# Patient Record
Sex: MALE | Race: BLACK OR AFRICAN AMERICAN | NOT HISPANIC OR LATINO | Employment: FULL TIME | URBAN - METROPOLITAN AREA
[De-identification: names, ages, dates, MRNs, and addresses within clinical notes are randomized per-mention and may not be internally consistent; named-entity substitution may affect disease eponyms.]

---

## 2021-11-17 ENCOUNTER — OFFICE VISIT (OUTPATIENT)
Dept: URGENT CARE | Facility: URGENT CARE | Age: 21
End: 2021-11-17
Payer: COMMERCIAL

## 2021-11-17 VITALS
DIASTOLIC BLOOD PRESSURE: 92 MMHG | WEIGHT: 190.2 LBS | SYSTOLIC BLOOD PRESSURE: 131 MMHG | OXYGEN SATURATION: 97 % | HEART RATE: 76 BPM | TEMPERATURE: 98 F

## 2021-11-17 DIAGNOSIS — R07.0 THROAT PAIN: ICD-10-CM

## 2021-11-17 DIAGNOSIS — R68.83 CHILLS: ICD-10-CM

## 2021-11-17 DIAGNOSIS — J10.1 INFLUENZA B: Primary | ICD-10-CM

## 2021-11-17 LAB
DEPRECATED S PYO AG THROAT QL EIA: NEGATIVE
FLUAV AG SPEC QL IA: NEGATIVE
FLUBV AG SPEC QL IA: POSITIVE

## 2021-11-17 PROCEDURE — 87651 STREP A DNA AMP PROBE: CPT | Performed by: FAMILY MEDICINE

## 2021-11-17 PROCEDURE — 99203 OFFICE O/P NEW LOW 30 MIN: CPT | Performed by: FAMILY MEDICINE

## 2021-11-17 PROCEDURE — 87804 INFLUENZA ASSAY W/OPTIC: CPT | Performed by: FAMILY MEDICINE

## 2021-11-17 NOTE — LETTER
November 17, 2021      Clifford Steiner  [address]  [facility] [address]        To Whom It May Concern,     Clifford Steiner was seen in clinic today. He should be able to return on or about November 19, 2021       Sincerely,        Beau Schmitz MD

## 2021-11-18 LAB — GROUP A STREP BY PCR: NOT DETECTED

## 2021-11-18 NOTE — PROGRESS NOTES
ICD-10-CM    1. Influenza B  J10.1    2. Throat pain  R07.0 Streptococcus A Rapid Screen w/Reflex to PCR - Clinic Collect     Group A Streptococcus PCR Throat Swab   3. Chills  R68.83 Influenza A & B Antigen - Clinic Collect   fever seems to be resolving and overall is starting to feel better. Symptomatic therapy and follow up discussed. educational info given.   -------------------------------  Clifford Steiner with presents with 3-4 days symptoms including sore throat, congestion, non productive cough, achiness, low grade fevers, low energy. No trouble breathing. .    Tested negative already for covid today.    Treatment measures tried include Tylenol/Ibuprofen and OTC Cough med.  Exposures--none  Recent travel--no    No current outpatient medications on file.       ROS otherwise negative for resp., ID,  HEENT symptoms.    Objective: BP (!) 131/92 (BP Location: Left arm, Patient Position: Sitting, Cuff Size: Adult Regular)   Pulse 76   Temp 98  F (36.7  C) (Tympanic)   Wt 86.3 kg (190 lb 3.2 oz)   SpO2 97%   Exam:  GENERAL APPEARANCE: healthy, alert and no distress  EYES: Eyes grossly normal to inspection  HENT: ear canals and TM's normal and nose and mouth without ulcers or lesions  NECK: no adenopathy, no asymmetry, masses, or scars and thyroid normal to palpation  RESP: lungs clear to auscultation - no rales, rhonchi or wheezes  CV: regular rates and rhythm, no murmur

## 2021-11-18 NOTE — PATIENT INSTRUCTIONS
ibuprofen for achiness and pain and fever    Pseudoephedrine, guaifenesin, afrin (oxymetolazone-max 3 days) and hot steamy beverages and soups and showers for congestion.    Patient Education     Influenza (Adult)    Influenza is also called the flu. It's a viral illness that affects the air passages of your lungs. It's different from the common cold. The flu can easily be passed from one to person to another. It may be spread through the air by coughing and sneezing. Or it can be spread by touching the sick person and then touching your own eyes, nose, or mouth.   The flu starts 1 to 3 days after you are exposed to the flu virus. It may last for 1 to 2 weeks but sometimes people feel tired or fatigued for many weeks afterward. You usually don t need to take antibiotics unless you are at high risk for or have a complication . This might be an ear or sinus infection or pneumonia.   Symptoms of the flu may be mild or severe. They can include extreme tiredness (wanting to stay in bed all day), chills, fevers, muscle aches, soreness with eye movement, headache, and a dry, hacking cough.   Antiviral medicine for the flu is available by prescription. If you start taking it within 48 hours, it may help reduce how long your symptoms last and how severe they are. Your provider may do a test to find out if you have influenza and which strain you have.   Home care  Follow these guidelines when caring for yourself at home:    Stay away from cigarette smoke, whether yours or other people s.    Acetaminophen or ibuprofen will help ease your fever, muscle aches, and headache. Don t give aspirin to anyone younger than 18 who has the flu. This can cause a serious condition called Reye syndrome.    Nausea, loose stools, and loss of appetite are common with the flu. Eat light meals. Drink 6 to 8 glasses of liquids every day. Good choices are water, sport drinks, soft drinks without caffeine, juices, tea, and soup. Extra fluids will  also help loosen secretions in your nose and lungs.    Over-the-counter cold medicines will not make the flu go away faster. But the medicines may help with coughing, sore throat, and congestion in your nose and sinuses. Don t use a decongestant if you have high blood pressure.    Stay home until your fever has been gone for at least 24 hours without using medicine to reduce fever.  Follow-up care  Follow up with your healthcare provider, or as advised, if you are not getting better over the next week.   If you are age 65 or older, talk with your provider about getting a pneumococcal vaccine every 5 years. You should also get this vaccine if you have chronic asthma or COPD. All adults should get a flu vaccine every fall. Ask your provider about this.   When to seek medical advice  Call your healthcare provider right away if you have the flu and any of these occur:     Cough with lots of colored mucus (sputum) or blood in your mucus    Chest pain, shortness of breath, wheezing, or trouble breathing    Severe headache, or face, neck, or ear pain    New rash with fever    Fever of 100.4 F (38 C) or higher, or as directed by your healthcare provider    Confusion, behavior change, or seizure    Severe weakness or dizziness    You get a new fever or cough after getting better for a few days  Also call your provider if you have flu symptoms and have a weakened immune system or are taking medicines that can weaken your immune system. These include steroids and certain anti-inflammatory medicines.   Fe last reviewed this educational content on 10/1/2019    0309-3829 [employer]. All rights reserved. This information is not intended as a substitute for professional medical care. Always follow your healthcare professional's instructions.

## 2022-04-26 ENCOUNTER — OFFICE VISIT (OUTPATIENT)
Dept: URGENT CARE | Facility: URGENT CARE | Age: 22
End: 2022-04-26
Payer: OTHER MISCELLANEOUS

## 2022-04-26 ENCOUNTER — ANCILLARY PROCEDURE (OUTPATIENT)
Dept: GENERAL RADIOLOGY | Facility: CLINIC | Age: 22
End: 2022-04-26
Attending: PHYSICIAN ASSISTANT
Payer: OTHER MISCELLANEOUS

## 2022-04-26 VITALS
DIASTOLIC BLOOD PRESSURE: 84 MMHG | SYSTOLIC BLOOD PRESSURE: 137 MMHG | TEMPERATURE: 97.9 F | HEART RATE: 57 BPM | WEIGHT: 169.4 LBS | OXYGEN SATURATION: 100 %

## 2022-04-26 DIAGNOSIS — S91.209A TRAUMATIC AVULSION OF NAIL PLATE OF TOE, INITIAL ENCOUNTER: Primary | ICD-10-CM

## 2022-04-26 DIAGNOSIS — S97.112A CRUSHING INJURY OF LEFT GREAT TOE, INITIAL ENCOUNTER: ICD-10-CM

## 2022-04-26 DIAGNOSIS — Z23 NEED FOR TETANUS BOOSTER: ICD-10-CM

## 2022-04-26 PROCEDURE — 99213 OFFICE O/P EST LOW 20 MIN: CPT | Mod: 25 | Performed by: PHYSICIAN ASSISTANT

## 2022-04-26 PROCEDURE — 90715 TDAP VACCINE 7 YRS/> IM: CPT | Performed by: PHYSICIAN ASSISTANT

## 2022-04-26 PROCEDURE — 90471 IMMUNIZATION ADMIN: CPT | Performed by: PHYSICIAN ASSISTANT

## 2022-04-26 PROCEDURE — 73660 X-RAY EXAM OF TOE(S): CPT | Mod: LT | Performed by: RADIOLOGY

## 2022-04-26 RX ORDER — NAPROXEN 500 MG/1
500 TABLET ORAL 2 TIMES DAILY WITH MEALS
Qty: 20 TABLET | Refills: 1 | Status: SHIPPED | OUTPATIENT
Start: 2022-04-26

## 2022-04-26 NOTE — LETTER
[facility]  [address] [facility] [address]  Phone: [phone]    April 26, 2022        Clifford Steiner  [address] [facility] [address]          To whom it may concern:    RE: Clifford Steiner    Patient was seen and treated today at our clinic.  Sustained a crush injury to the left great toe with nail avulsion.  Updated on tetanus.  Dressing placed.  Start antibiotic.  Follow-up with podiatry within 3 days for recheck.  In the interim restrictions as follow: No lifting more than 5 pounds.  No standing more than 15 minutes at a time.  No ladders.  Postop shoe at all times until seen by specialist later this week.  Any paperwork or further restrictions should be through podiatry/Ortho.  Naprosyn as needed for pain.    Please contact me for questions or concerns.      Sincerely,        Rula Pichardo PA-C

## 2022-04-26 NOTE — TELEPHONE ENCOUNTER
DIAGNOSIS: Crushing injury of left great toe, Traumatic avulsion of nail plate of toe   APPOINTMENT DATE: 05/03/2022   NOTES STATUS DETAILS   OFFICE NOTE from referring provider Internal 04/26/2022 Dr Pichardo [facility] urgent care   OFFICE NOTE from other specialist N/A    DISCHARGE SUMMARY from hospital N/A    DISCHARGE REPORT from the ER N/A    OPERATIVE REPORT N/A    EMG report N/A    MEDICATION LIST N/A    MRI N/A    DEXA (osteoporosis/bone health) N/A    CT SCAN N/A    XRAYS (IMAGES & REPORTS) Internal 04/26/2022 LFT toe

## 2022-04-26 NOTE — PROGRESS NOTES
Chief Complaint   Patient presents with     Toe Pain     Metal bar fell on left big toe today at work. Swelling, able to move it , but pt states it feels funny.      X-ray-I do not see any obvious fracture.      ASSESSMENT:    ICD-10-CM    1. Traumatic avulsion of nail plate of toe, initial encounter  S91.209A XR Toe Left G/E 2 Views     TDAP VACCINE (Adacel, Boostrix)  [[id number]]     Orthopedic  Referral     Ankle/Foot Bracing Supplies Order for DME - ONLY FOR DME     amoxicillin-clavulanate (AUGMENTIN) 875-125 MG tablet     naproxen (NAPROSYN) 500 MG tablet   2. Crushing injury of left great toe, initial encounter  S97.112A XR Toe Left G/E 2 Views     TDAP VACCINE (Adacel, Boostrix)  [[id number]]     Orthopedic  Referral     Ankle/Foot Bracing Supplies Order for DME - ONLY FOR DME     amoxicillin-clavulanate (AUGMENTIN) 875-125 MG tablet     naproxen (NAPROSYN) 500 MG tablet   3. Need for tetanus booster  Z23 XR Toe Left G/E 2 Views     TDAP VACCINE (Adacel, Boostrix)  [[id number]]     Orthopedic  Referral     Ankle/Foot Bracing Supplies Order for DME - ONLY FOR DME     amoxicillin-clavulanate (AUGMENTIN) 875-125 MG tablet     naproxen (NAPROSYN) 500 MG tablet           PLAN:Patient was seen and treated today at our clinic.  Sustained a crush injury to the left great toe with nail avulsion.  Updated on tetanus.  Dressing placed.  Start antibiotic.  Follow-up with podiatry within 3 days for recheck.  In the interim restrictions as follow: No lifting more than 5 pounds.  No standing more than 15 minutes at a time.  No ladders.  Postop shoe at all times until seen by specialist later this week.  Any paperwork or further restrictions should be through podiatry/Ortho.  Naprosyn as needed for pain. Likely will loose the nail.           Rula Pichardo PA-C        SUBJECTIVE:  Clifford Steiner is an 21 year old male who presents with work injury today.  Was disassembling a shelf and a metal  bar that weighed from 20 to 50 pounds dropped on his left great toe.  Last tetanus shot 2012.  Bled quite a bit.  Broke the skin. 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    tdap 2012  No past medical history on file.  History   Smoking Status     Not on file   Smokeless Tobacco     Not on file       ROS:  GEN no fevers  SKIN no erythema  Musculoskeletal:  See HPI.      OBJECTIVE:  Blood pressure 137/84, pulse 57, temperature 97.9  F (36.6  C), temperature source Tympanic, weight 76.8 kg (169 lb 6.4 oz), SpO2 100 %.  Patient is alert and NAD.  EYES: conjunctiva clear  Ankle/foot exam (left):  Inspection/palpation: Left great toe is with ripped skin proximal to the bottom nail edge where nail popped up. Nail mildly loose but still in place.  Able to wiggle great toe.Toe tender. Sensation to soft touch intact.  Cap refill intact.    Good doralis pedis.  Neurovascularly Intact Distally.         Rula Pichardo PA-C

## 2022-05-03 ENCOUNTER — OFFICE VISIT (OUTPATIENT)
Dept: PODIATRY | Facility: CLINIC | Age: 22
End: 2022-05-03
Attending: PHYSICIAN ASSISTANT
Payer: OTHER MISCELLANEOUS

## 2022-05-03 ENCOUNTER — PRE VISIT (OUTPATIENT)
Dept: PODIATRY | Facility: CLINIC | Age: 22
End: 2022-05-03

## 2022-05-03 DIAGNOSIS — S91.209A TRAUMATIC AVULSION OF NAIL PLATE OF TOE, INITIAL ENCOUNTER: ICD-10-CM

## 2022-05-03 DIAGNOSIS — S97.112A CRUSHING INJURY OF LEFT GREAT TOE, INITIAL ENCOUNTER: ICD-10-CM

## 2022-05-03 PROCEDURE — 99204 OFFICE O/P NEW MOD 45 MIN: CPT | Performed by: PODIATRIST

## 2022-05-03 NOTE — NURSING NOTE
Clifford Steiner's chief complaint for this visit includes:  Chief Complaint   Patient presents with     Consult     Crush injury to left foot grand toe     PCP: No Ref-Primary, Physician    Referring Provider:  [place] [address]    There were no vitals taken for this visit.  Data Unavailable        Allergies   Allergen Reactions     Pineapple Other (See Comments)     Possible (positive RAST test as toddler)         Do you need any medication refills at today's visit?

## 2022-05-03 NOTE — LETTER
5/3/2022         RE: Clifford Steiner  [address]  [place] [address]        Dear Colleague,    Thank you for referring your patient, Clifford Steiner, to the [facility]. Please see a copy of my visit note below.    No past medical history on file.  There is no problem list on file for this patient.    No past surgical history on file.  Social History     Socioeconomic History     Marital status: Single     Spouse name: Not on file     Number of children: Not on file     Years of education: Not on file     Highest education level: Not on file   Occupational History     Not on file   Tobacco Use     Smoking status: Not on file     Smokeless tobacco: Not on file   Substance and Sexual Activity     Alcohol use: Not on file     Drug use: Not on file     Sexual activity: Not on file   Other Topics Concern     Not on file   Social History Narrative     Not on file     Social Determinants of Health     Financial Resource Strain: Not on file   Food Insecurity: Not on file   Transportation Needs: Not on file   Physical Activity: Not on file   Stress: Not on file   Social Connections: Not on file   Intimate Partner Violence: Not on file   Housing Stability: Not on file     No family history on file.        SUBJECTIVE FINDINGS:  21-year-old presents from urgent care for a crush injury, left great toe.  Relates he dropped a metal bar on it about a week ago.  I reviewed the 04/26/2022 urgent care note.  Relates he is taking his last day of Augmentin.  He has had no problems with that.  Relates he is getting better.  He is wearing a surgical shoe.  He was supposed to be putting an antibiotic ointment on it, but he left that so he does not have that.  Relates that he does not need any more of the Naprosyn. 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    OBJECTIVE FINDINGS:  DP and PT are 2/4, left.  He has a left hallux nail that is intact.  The proximal nail with eschar with  partially open lesion.  There is minimal erythema, positive edema, no active serosanguineous drainage, no odor, no calor.  There is pain on palpation.    X-rays reviewed from 04/26/2022.  Joint and cortical margins are intact.  There is some edema.  No gross fractures.  Report also reviewed.    ASSESSMENT AND PLAN:  Crush injury, left hallux, with partial nail avulsion.  Diagnosis and treatment options discussed with the patient.  I am going to have him daily rinse this with Wound Vashe, apply Betadine and wrap with Kerlix and Coban for light compression.  Continue the surgical shoe.  Continue rest and elevate.  I advised him to be off work, a sitting job only would be okay.  Plan will be to re-x-ray in 1 week to make sure there is no hairline fractures.  We will leave the nail plate in place.  This appears to be re-adhered without abscess.  Continue the Augmentin.  Return to clinic and see me in 1 week.              Moderate level of medical decision making.      Again, thank you for allowing me to participate in the care of your patient.        Sincerely,        Eulalio Almanza DPM

## 2022-05-03 NOTE — LETTER
May 3, 2022      RE: Clifford Steiner  [address]  [place] [facility] [address]        To whom it may concern:    Clifford Steiner is under my professional care. For the next 4 to 6 weeks he is only to do sitting work.     Sincerely,      Eulalio Almanza DPM

## 2022-05-03 NOTE — PROGRESS NOTES
No past medical history on file.  There is no problem list on file for this patient.    No past surgical history on file.  Social History     Socioeconomic History     Marital status: Single     Spouse name: Not on file     Number of children: Not on file     Years of education: Not on file     Highest education level: Not on file   Occupational History     Not on file   Tobacco Use     Smoking status: Not on file     Smokeless tobacco: Not on file   Substance and Sexual Activity     Alcohol use: Not on file     Drug use: Not on file     Sexual activity: Not on file   Other Topics Concern     Not on file   Social History Narrative     Not on file     Social Determinants of Health     Financial Resource Strain: Not on file   Food Insecurity: Not on file   Transportation Needs: Not on file   Physical Activity: Not on file   Stress: Not on file   Social Connections: Not on file   Intimate Partner Violence: Not on file   Housing Stability: Not on file     No family history on file.        SUBJECTIVE FINDINGS:  21-year-old presents from urgent care for a crush injury, left great toe.  Relates he dropped a metal bar on it about a week ago.  I reviewed the 04/26/2022 urgent care note.  Relates he is taking his last day of Augmentin.  He has had no problems with that.  Relates he is getting better.  He is wearing a surgical shoe.  He was supposed to be putting an antibiotic ointment on it, but he left that so he does not have that.  Relates that he does not need any more of the Naprosyn. 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    OBJECTIVE FINDINGS:  DP and PT are 2/4, left.  He has a left hallux nail that is intact.  The proximal nail with eschar with partially open lesion.  There is minimal erythema, positive edema, no active serosanguineous drainage, no odor, no calor.  There is pain on palpation.    X-rays reviewed from 04/26/2022.  Joint and cortical margins are intact.  There is some edema.  No gross fractures.   Report also reviewed.    ASSESSMENT AND PLAN:  Crush injury, left hallux, with partial nail avulsion.  Diagnosis and treatment options discussed with the patient.  I am going to have him daily rinse this with Wound Vashe, apply Betadine and wrap with Kerlix and Coban for light compression.  Continue the surgical shoe.  Continue rest and elevate.  I advised him to be off work, a sitting job only would be okay.  Plan will be to re-x-ray in 1 week to make sure there is no hairline fractures.  We will leave the nail plate in place.  This appears to be re-adhered without abscess.  Continue the Augmentin.  Return to clinic and see me in 1 week.              Moderate level of medical decision making.

## 2022-05-10 ENCOUNTER — ANCILLARY PROCEDURE (OUTPATIENT)
Dept: GENERAL RADIOLOGY | Facility: CLINIC | Age: 22
End: 2022-05-10
Attending: PODIATRIST
Payer: OTHER MISCELLANEOUS

## 2022-05-10 ENCOUNTER — OFFICE VISIT (OUTPATIENT)
Dept: PODIATRY | Facility: CLINIC | Age: 22
End: 2022-05-10
Payer: OTHER MISCELLANEOUS

## 2022-05-10 DIAGNOSIS — S97.112A CRUSHING INJURY OF LEFT GREAT TOE, INITIAL ENCOUNTER: Primary | ICD-10-CM

## 2022-05-10 DIAGNOSIS — S91.209A TRAUMATIC AVULSION OF NAIL PLATE OF TOE, INITIAL ENCOUNTER: ICD-10-CM

## 2022-05-10 PROCEDURE — 99213 OFFICE O/P EST LOW 20 MIN: CPT | Performed by: PODIATRIST

## 2022-05-10 PROCEDURE — 73630 X-RAY EXAM OF FOOT: CPT | Mod: LT | Performed by: RADIOLOGY

## 2022-05-10 ASSESSMENT — PAIN SCALES - GENERAL: PAINLEVEL: NO PAIN (0)

## 2022-05-10 NOTE — PROGRESS NOTES
No past medical history on file.  There is no problem list on file for this patient.    No past surgical history on file.  Social History     Socioeconomic History     Marital status: Single     Spouse name: Not on file     Number of children: Not on file     Years of education: Not on file     Highest education level: Not on file   Occupational History     Not on file   Tobacco Use     Smoking status: Not on file     Smokeless tobacco: Not on file   Substance and Sexual Activity     Alcohol use: Not on file     Drug use: Not on file     Sexual activity: Not on file   Other Topics Concern     Not on file   Social History Narrative     Not on file     Social Determinants of Health     Financial Resource Strain: Not on file   Food Insecurity: Not on file   Transportation Needs: Not on file   Physical Activity: Not on file   Stress: Not on file   Social Connections: Not on file   Intimate Partner Violence: Not on file   Housing Stability: Not on file     No family history on file.      SUBJECTIVE FINDINGS:  This 21-year-old returns to clinic for a crush injury, left hallux with partial nail avulsion.  He relates he is feeling better.  The swelling is coming down, he is wrapping it.  He finished the Augmentin with no problems.  He is using the surgical shoe.    OBJECTIVE FINDINGS:  Vascular status intact to the left.  He has left hallux nails intact.  There is no erythema, no drainage.  Minimal edema, no odor, no calor.    X-rays reviewed with the patient in clinic today.  Today's x-rays compared to 04/26/2022 x-rays.  He does have an area of increased radiopacity consistent with a crush injury, bony healing on the lateral distal phalanx of the hallux.    ASSESSMENT AND PLAN:  Left hallux crush injury with partial nail avulsion.  This is healing well.  Diagnosis and treatment discussed with him.  I advised him on stretching exercises.  Continue the surgical shoe.  He is on his feet at work.  We will keep him off work.   He can increase activities gradually as tolerated for routine daily activities, no running, jumping activities.  Return to clinic and see me in 3 weeks.  Previous notes reviewed.

## 2022-05-10 NOTE — NURSING NOTE
Clifford Steiner's chief complaint for this visit includes:  Chief Complaint   Patient presents with     Left Foot - WOUND CARE     Left great toe     PCP: No Ref-Primary, Physician    Referring Provider:  [place] [address]    There were no vitals taken for this visit.  No Pain (0)     Do you need any medication refills at today's visit? NO    Allergies   Allergen Reactions     Pineapple Other (See Comments)     Possible (positive RAST test as toddler)       Jorge Montgomery, EMT

## 2022-05-10 NOTE — LETTER
5/10/2022         RE: Clifford Steiner  [address] [facility] [address]        Dear Colleague,    Thank you for referring your patient, Clifford Steiner, to the [facility]. Please see a copy of my visit note below.    No past medical history on file.  There is no problem list on file for this patient.    No past surgical history on file.  Social History     Socioeconomic History     Marital status: Single     Spouse name: Not on file     Number of children: Not on file     Years of education: Not on file     Highest education level: Not on file   Occupational History     Not on file   Tobacco Use     Smoking status: Not on file     Smokeless tobacco: Not on file   Substance and Sexual Activity     Alcohol use: Not on file     Drug use: Not on file     Sexual activity: Not on file   Other Topics Concern     Not on file   Social History Narrative     Not on file     Social Determinants of Health     Financial Resource Strain: Not on file   Food Insecurity: Not on file   Transportation Needs: Not on file   Physical Activity: Not on file   Stress: Not on file   Social Connections: Not on file   Intimate Partner Violence: Not on file   Housing Stability: Not on file     No family history on file.      SUBJECTIVE FINDINGS:  This 21-year-old returns to clinic for a crush injury, left hallux with partial nail avulsion.  He relates he is feeling better.  The swelling is coming down, he is wrapping it.  He finished the Augmentin with no problems.  He is using the surgical shoe.    OBJECTIVE FINDINGS:  Vascular status intact to the left.  He has left hallux nails intact.  There is no erythema, no drainage.  Minimal edema, no odor, no calor.    X-rays reviewed with the patient in clinic today.  Today's x-rays compared to 04/26/2022 x-rays.  He does have an area of increased radiopacity consistent with a crush injury, bony healing on the lateral distal phalanx of the  hallux.    ASSESSMENT AND PLAN:  Left hallux crush injury with partial nail avulsion.  This is healing well.  Diagnosis and treatment discussed with him.  I advised him on stretching exercises.  Continue the surgical shoe.  He is on his feet at work.  We will keep him off work.  He can increase activities gradually as tolerated for routine daily activities, no running, jumping activities.  Return to clinic and see me in 3 weeks.  Previous notes reviewed.              Again, thank you for allowing me to participate in the care of your patient.        Sincerely,        Eulalio Almanza DPM

## 2022-05-31 ENCOUNTER — OFFICE VISIT (OUTPATIENT)
Dept: PODIATRY | Facility: CLINIC | Age: 22
End: 2022-05-31
Payer: OTHER MISCELLANEOUS

## 2022-05-31 DIAGNOSIS — S97.102S: Primary | ICD-10-CM

## 2022-05-31 DIAGNOSIS — S97.82XS: Primary | ICD-10-CM

## 2022-05-31 PROCEDURE — 99213 OFFICE O/P EST LOW 20 MIN: CPT | Performed by: PODIATRIST

## 2022-05-31 ASSESSMENT — PAIN SCALES - GENERAL: PAINLEVEL: NO PAIN (0)

## 2022-05-31 NOTE — PROGRESS NOTES
No past medical history on file.  There is no problem list on file for this patient.    No past surgical history on file.  Social History     Socioeconomic History     Marital status: Single     Spouse name: Not on file     Number of children: Not on file     Years of education: Not on file     Highest education level: Not on file   Occupational History     Not on file   Tobacco Use     Smoking status: Current Every Day Smoker     Smokeless tobacco: Never Used   Substance and Sexual Activity     Alcohol use: Never     Drug use: Never     Sexual activity: Not on file   Other Topics Concern     Not on file   Social History Narrative     Not on file     Social Determinants of Health     Financial Resource Strain: Not on file   Food Insecurity: Not on file   Transportation Needs: Not on file   Physical Activity: Not on file   Stress: Not on file   Social Connections: Not on file   Intimate Partner Violence: Not on file   Housing Stability: Not on file     No family history on file.        SUBJECTIVE FINDINGS:  21-year-old returns to clinic for left hallux crush injury with partial nail avulsion.  Relates he is doing well.  He mowed the lawn the other day and it went well.  He has had no pain.    OBJECTIVE FINDINGS:  Left hallux, still some subungual bruising.  There is no erythema, no drainage, no odor, no calor.  No pain on palpation.  Range of motion is within normal limits.    ASSESSMENT/PLAN:  Left hallux crush injury with partial nail avulsion.  This is healing well.  He can discontinue wrapping as tolerated.  If he wants to continue it, that is okay.  Resume activity as tolerated.  He opted for no physical therapy today.  He relates he is doing well and having no problems.  We will hold off on further x-rays.  Return to clinic and see me if he has any further problems.  Previous notes and x-rays reviewed.

## 2022-05-31 NOTE — NURSING NOTE
Clifford Steiner's chief complaint for this visit includes:  Chief Complaint   Patient presents with     Left Foot - WOUND CARE     PCP: No Ref-Primary, Physician    Referring Provider:  [place] [address]    There were no vitals taken for this visit.  No Pain (0)     Do you need any medication refills at today's visit? NO    Allergies   Allergen Reactions     Pineapple Other (See Comments)     Possible (positive RAST test as toddler)       Jorge Montgomery, EMT

## 2022-05-31 NOTE — LETTER
[facility]  [address] [address] [place] [id number]  [phone]          May 31, 2022    RE:  Clifford Steiner                                                                                                                                                       [address]  [place] [address]            To whom it may concern:    Clifford Steiner is under my professional care for crushing injury. He may return to work with the following: The employee is ABLE to return to work as of 6/01/2022 without restrictions.          Sincerely,        Eulalio Almanza DPM

## 2022-05-31 NOTE — LETTER
5/31/2022         RE: Clifford Steiner  [address]  [place] [address]        Dear Colleague,    Thank you for referring your patient, Clifford Steiner, to the [facility]. Please see a copy of my visit note below.    No past medical history on file.  There is no problem list on file for this patient.    No past surgical history on file.  Social History     Socioeconomic History     Marital status: Single     Spouse name: Not on file     Number of children: Not on file     Years of education: Not on file     Highest education level: Not on file   Occupational History     Not on file   Tobacco Use     Smoking status: Current Every Day Smoker     Smokeless tobacco: Never Used   Substance and Sexual Activity     Alcohol use: Never     Drug use: Never     Sexual activity: Not on file   Other Topics Concern     Not on file   Social History Narrative     Not on file     Social Determinants of Health     Financial Resource Strain: Not on file   Food Insecurity: Not on file   Transportation Needs: Not on file   Physical Activity: Not on file   Stress: Not on file   Social Connections: Not on file   Intimate Partner Violence: Not on file   Housing Stability: Not on file     No family history on file.        SUBJECTIVE FINDINGS:  21-year-old returns to clinic for left hallux crush injury with partial nail avulsion.  Relates he is doing well.  He mowed the lawn the other day and it went well.  He has had no pain.    OBJECTIVE FINDINGS:  Left hallux, still some subungual bruising.  There is no erythema, no drainage, no odor, no calor.  No pain on palpation.  Range of motion is within normal limits.    ASSESSMENT/PLAN:  Left hallux crush injury with partial nail avulsion.  This is healing well.  He can discontinue wrapping as tolerated.  If he wants to continue it, that is okay.  Resume activity as tolerated.  He opted for no physical therapy today.  He relates he is doing well and  having no problems.  We will hold off on further x-rays.  Return to clinic and see me if he has any further problems.  Previous notes and x-rays reviewed.          Again, thank you for allowing me to participate in the care of your patient.        Sincerely,        Eulalio Almanza DPM